# Patient Record
Sex: MALE | Race: BLACK OR AFRICAN AMERICAN | Employment: OTHER | URBAN - METROPOLITAN AREA
[De-identification: names, ages, dates, MRNs, and addresses within clinical notes are randomized per-mention and may not be internally consistent; named-entity substitution may affect disease eponyms.]

---

## 2023-07-03 ENCOUNTER — TELEPHONE (OUTPATIENT)
Dept: UROLOGY | Facility: CLINIC | Age: 73
End: 2023-07-03
Payer: MEDICARE

## 2023-07-13 DIAGNOSIS — R31.9 HEMATURIA: Primary | ICD-10-CM

## 2023-07-26 ENCOUNTER — OFFICE VISIT (OUTPATIENT)
Dept: UROLOGY | Facility: CLINIC | Age: 73
End: 2023-07-26
Payer: MEDICARE

## 2023-07-26 VITALS — RESPIRATION RATE: 18 BRPM | HEIGHT: 66 IN | BODY MASS INDEX: 32.78 KG/M2 | WEIGHT: 204 LBS

## 2023-07-26 DIAGNOSIS — R31.21 ASYMPTOMATIC MICROSCOPIC HEMATURIA: ICD-10-CM

## 2023-07-26 LAB
BILIRUBIN, UA POC OHS: NEGATIVE
BLOOD, UA POC OHS: ABNORMAL
CLARITY, UA POC OHS: CLEAR
COLOR, UA POC OHS: YELLOW
GLUCOSE, UA POC OHS: NEGATIVE
KETONES, UA POC OHS: NEGATIVE
LEUKOCYTES, UA POC OHS: ABNORMAL
NITRITE, UA POC OHS: NEGATIVE
PH, UA POC OHS: 5.5
PROTEIN, UA POC OHS: >=300
SPECIFIC GRAVITY, UA POC OHS: 1.02
UROBILINOGEN, UA POC OHS: 0.2

## 2023-07-26 PROCEDURE — 1101F PR PT FALLS ASSESS DOC 0-1 FALLS W/OUT INJ PAST YR: ICD-10-PCS | Mod: CPTII,S$GLB,, | Performed by: NURSE PRACTITIONER

## 2023-07-26 PROCEDURE — 3008F BODY MASS INDEX DOCD: CPT | Mod: CPTII,S$GLB,, | Performed by: NURSE PRACTITIONER

## 2023-07-26 PROCEDURE — 1159F MED LIST DOCD IN RCRD: CPT | Mod: CPTII,S$GLB,, | Performed by: NURSE PRACTITIONER

## 2023-07-26 PROCEDURE — 1126F PR PAIN SEVERITY QUANTIFIED, NO PAIN PRESENT: ICD-10-PCS | Mod: CPTII,S$GLB,, | Performed by: NURSE PRACTITIONER

## 2023-07-26 PROCEDURE — 99204 OFFICE O/P NEW MOD 45 MIN: CPT | Mod: S$GLB,,, | Performed by: NURSE PRACTITIONER

## 2023-07-26 PROCEDURE — 1159F PR MEDICATION LIST DOCUMENTED IN MEDICAL RECORD: ICD-10-PCS | Mod: CPTII,S$GLB,, | Performed by: NURSE PRACTITIONER

## 2023-07-26 PROCEDURE — 1101F PT FALLS ASSESS-DOCD LE1/YR: CPT | Mod: CPTII,S$GLB,, | Performed by: NURSE PRACTITIONER

## 2023-07-26 PROCEDURE — 1160F PR REVIEW ALL MEDS BY PRESCRIBER/CLIN PHARMACIST DOCUMENTED: ICD-10-PCS | Mod: CPTII,S$GLB,, | Performed by: NURSE PRACTITIONER

## 2023-07-26 PROCEDURE — 4010F ACE/ARB THERAPY RXD/TAKEN: CPT | Mod: CPTII,S$GLB,, | Performed by: NURSE PRACTITIONER

## 2023-07-26 PROCEDURE — 1126F AMNT PAIN NOTED NONE PRSNT: CPT | Mod: CPTII,S$GLB,, | Performed by: NURSE PRACTITIONER

## 2023-07-26 PROCEDURE — 4010F PR ACE/ARB THEARPY RXD/TAKEN: ICD-10-PCS | Mod: CPTII,S$GLB,, | Performed by: NURSE PRACTITIONER

## 2023-07-26 PROCEDURE — 81003 URINALYSIS AUTO W/O SCOPE: CPT | Mod: QW,S$GLB,, | Performed by: NURSE PRACTITIONER

## 2023-07-26 PROCEDURE — 3288F FALL RISK ASSESSMENT DOCD: CPT | Mod: CPTII,S$GLB,, | Performed by: NURSE PRACTITIONER

## 2023-07-26 PROCEDURE — 99204 PR OFFICE/OUTPT VISIT, NEW, LEVL IV, 45-59 MIN: ICD-10-PCS | Mod: S$GLB,,, | Performed by: NURSE PRACTITIONER

## 2023-07-26 PROCEDURE — 81003 POCT URINALYSIS(INSTRUMENT): ICD-10-PCS | Mod: QW,S$GLB,, | Performed by: NURSE PRACTITIONER

## 2023-07-26 PROCEDURE — 3288F PR FALLS RISK ASSESSMENT DOCUMENTED: ICD-10-PCS | Mod: CPTII,S$GLB,, | Performed by: NURSE PRACTITIONER

## 2023-07-26 PROCEDURE — 3008F PR BODY MASS INDEX (BMI) DOCUMENTED: ICD-10-PCS | Mod: CPTII,S$GLB,, | Performed by: NURSE PRACTITIONER

## 2023-07-26 PROCEDURE — 1160F RVW MEDS BY RX/DR IN RCRD: CPT | Mod: CPTII,S$GLB,, | Performed by: NURSE PRACTITIONER

## 2023-07-26 RX ORDER — ALOGLIPTIN 12.5 MG/1
TABLET, FILM COATED ORAL
COMMUNITY

## 2023-07-26 RX ORDER — GABAPENTIN 600 MG/1
600 TABLET ORAL
COMMUNITY

## 2023-07-26 RX ORDER — AMIODARONE HYDROCHLORIDE 200 MG/1
TABLET ORAL
COMMUNITY
Start: 2023-05-10

## 2023-07-26 RX ORDER — LISINOPRIL 10 MG/1
10 TABLET ORAL
COMMUNITY
Start: 2022-11-09

## 2023-07-26 RX ORDER — METHOCARBAMOL 500 MG/1
TABLET, FILM COATED ORAL
COMMUNITY
Start: 2023-06-12

## 2023-07-26 RX ORDER — PREDNISONE 20 MG/1
40 TABLET ORAL
COMMUNITY
Start: 2023-06-12

## 2023-07-26 RX ORDER — MIRABEGRON 25 MG/1
25 TABLET, FILM COATED, EXTENDED RELEASE ORAL
COMMUNITY
Start: 2023-07-20

## 2023-07-26 RX ORDER — SERTRALINE HYDROCHLORIDE 100 MG/1
100 TABLET, FILM COATED ORAL
COMMUNITY

## 2023-07-26 RX ORDER — PRAVASTATIN SODIUM 20 MG/1
20 TABLET ORAL
COMMUNITY

## 2023-07-26 RX ORDER — CALCIUM CITRATE MALATE/VIT D3 250 MG-2.5
1 TABLET ORAL 2 TIMES DAILY
COMMUNITY

## 2023-07-26 RX ORDER — EZETIMIBE 10 MG/1
10 TABLET ORAL
COMMUNITY

## 2023-07-26 RX ORDER — ALLOPURINOL 100 MG/1
100 TABLET ORAL
COMMUNITY

## 2023-07-26 RX ORDER — DOXAZOSIN 1 MG/1
1 TABLET ORAL NIGHTLY
COMMUNITY
Start: 2023-07-20

## 2023-07-26 RX ORDER — SEMAGLUTIDE 0.68 MG/ML
INJECTION, SOLUTION SUBCUTANEOUS
COMMUNITY
Start: 2023-06-26

## 2023-07-26 NOTE — PROGRESS NOTES
Subjective:       Patient ID: Vickey Barber is a 73 y.o. male.    Chief Complaint: Hematuria      HPI: 73-year-old male, new to [facility], referred for hematuria.    Patient has history of microscopic hematuria.    Patient denies any visible blood in urine.  Denies any unexpected weight loss.  Denies any pain burning urination.  Denies any odor urine.  Denies any fever or body aches.      Patient did have an ultrasound done on 07/10/2023.  Ultrasound shows a 8 mm cyst in the mid zone in the right kidney and a 6 mm cyst in the mid zone of the left kidney.  No masses noted.      Patient is a nonsmoker.    However, he is retired from local [employer].    No other urinary complaints at this time.       Past Medical History:   Past Medical History:   Diagnosis Date    Hypertension        Past Surgical Historical: History reviewed. No pertinent surgical history.     Medications:   Medication List with Changes/Refills   Current Medications    ALLOPURINOL (ZYLOPRIM) 100 MG TABLET    Take 100 mg by mouth.    ALOGLIPTIN (NESINA) 12.5 MG TAB    Take by mouth.    AMIODARONE (PACERONE) 200 MG TAB        CALCIUM CITRATE MALATE-VIT D3 (OSCAL) 250 MG-2.5 MCG (100 UNIT) TAB    Take 1 tablet by mouth 2 (two) times daily.    DOXAZOSIN (CARDURA) 1 MG TABLET    Take 1 mg by mouth every evening.    EZETIMIBE (ZETIA) 10 MG TABLET    Take 10 mg by mouth.    GABAPENTIN (NEURONTIN) 600 MG TABLET    Take 600 mg by mouth.    LISINOPRIL 10 MG TABLET    Take 10 mg by mouth.    METHOCARBAMOL (ROBAXIN) 500 MG TAB    TAKE 2 TABLETS BY MOUTH THREE TIMES DAILY AS NEEDED FOR PAIN AND FOR MUSCLE SPASM    MYRBETRIQ 25 MG TB24 ER TABLET    Take 25 mg by mouth.    OZEMPIC 0.25 MG OR 0.5 MG (2 MG/3 ML) PEN INJECTOR    INJECT 1 /4 MG SUBCUTANEOUSLY ONCE A WEEK INCREASE TO 0.5 MG WEEKLY AFTER 4 WEEKS.STOP ALOGLIPTIN IF TAKING THIS MEDICATION    PRAVASTATIN (PRAVACHOL) 20 MG TABLET    Take 20 mg by mouth.    PREDNISONE (DELTASONE) 20 MG TABLET    Take 40 mg  by mouth.    SERTRALINE (ZOLOFT) 100 MG TABLET    Take 100 mg by mouth.        Past Social History:   Social History     Socioeconomic History    Marital status:    Tobacco Use    Smoking status: Never    Smokeless tobacco: Never       Allergies:   Review of patient's allergies indicates:   Allergen Reactions    Iodine         Family History: History reviewed. No pertinent family history.     Review of Systems:  Review of Systems   Constitutional:  Negative for activity change and appetite change.   HENT:  Negative for congestion and dental problem.    Eyes:  Negative for visual disturbance.   Respiratory:  Negative for chest tightness and shortness of breath.    Cardiovascular:  Negative for chest pain.   Gastrointestinal:  Negative for abdominal distention and abdominal pain.   Genitourinary:  Positive for hematuria. Negative for decreased urine volume, difficulty urinating, dysuria, enuresis, flank pain, frequency, genital sores, penile discharge, penile pain, penile swelling, scrotal swelling, testicular pain and urgency.   Musculoskeletal:  Negative for back pain and neck pain.   Skin:  Negative for color change.   Neurological:  Negative for dizziness.   Hematological:  Negative for adenopathy.   Psychiatric/Behavioral:  Negative for agitation, behavioral problems and confusion.      Physical Exam:  Physical Exam  Vitals and nursing note reviewed.   Constitutional:       Appearance: He is well-developed.   HENT:      Head: Normocephalic.   Eyes:      Pupils: Pupils are equal, round, and reactive to light.   Cardiovascular:      Rate and Rhythm: Normal rate and regular rhythm.      Heart sounds: Normal heart sounds.   Pulmonary:      Effort: Pulmonary effort is normal.      Breath sounds: Normal breath sounds.   Abdominal:      General: Bowel sounds are normal.      Palpations: Abdomen is soft.   Musculoskeletal:         General: Normal range of motion.      Cervical back: Normal range of motion and neck  supple.   Skin:     General: Skin is warm and dry.   Neurological:      Mental Status: He is alert and oriented to person, place, and time.   Psychiatric:         Behavior: Behavior normal.     Urinalysis:  Large blood, red blood cells 50-75.  Trace leukocytes white blood cell 6 to 10, epithelial +1, bacteria +1.    Assessment/Plan:   Microscopic hematuria: Patient has had ultrasound with no renal masses noted.    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    Will arrange cysto with Dr. Lujan for further evaluation.    Follow-up to be arranged.  Problem List Items Addressed This Visit    None  Visit Diagnoses       Asymptomatic microscopic hematuria        Relevant Orders    POCT Urinalysis(Instrument) (Completed)    Cystoscopy

## 2023-08-21 ENCOUNTER — TELEPHONE (OUTPATIENT)
Dept: UROLOGY | Facility: CLINIC | Age: 73
End: 2023-08-21
Payer: MEDICARE

## 2023-10-04 ENCOUNTER — TELEPHONE (OUTPATIENT)
Dept: UROLOGY | Facility: CLINIC | Age: 73
End: 2023-10-04
Payer: MEDICARE

## 2023-10-05 ENCOUNTER — PROCEDURE VISIT (OUTPATIENT)
Dept: UROLOGY | Facility: CLINIC | Age: 73
End: 2023-10-05
Payer: MEDICARE

## 2023-10-05 VITALS
HEIGHT: 66 IN | DIASTOLIC BLOOD PRESSURE: 86 MMHG | OXYGEN SATURATION: 95 % | SYSTOLIC BLOOD PRESSURE: 144 MMHG | RESPIRATION RATE: 20 BRPM | BODY MASS INDEX: 34.26 KG/M2 | HEART RATE: 66 BPM | WEIGHT: 213.19 LBS

## 2023-10-05 DIAGNOSIS — R31.21 ASYMPTOMATIC MICROSCOPIC HEMATURIA: ICD-10-CM

## 2023-10-05 PROCEDURE — 52000 CYSTOSCOPY: ICD-10-PCS | Mod: S$GLB,,, | Performed by: UROLOGY

## 2023-10-05 PROCEDURE — 52000 CYSTOURETHROSCOPY: CPT | Mod: S$GLB,,, | Performed by: UROLOGY

## 2023-10-05 NOTE — PATIENT INSTRUCTIONS
Patient Education       Cystoscopy Discharge Instructions   About this topic   Your kidneys make urine. It is stored in your bladder. The urethra is a tube at the bottom of the bladder. Urine flows out of this tube. Sometimes, there is a blockage and urine is not able to leave the body.  A cystoscopy is a procedure that lets the doctor see the inside of your bladder and urethra. The doctor does it to:  Look for stones or tumors blocking the bladder and urethra  Look for changes or injury inside the bladder  Take a tissue sample from the inside of your bladder  Look for reasons for blood in the urine, pain with urination, or why you are passing urine often  Look for prostate problems     What care is needed at home?   Ask your doctor what you need to do when you go home. Make sure you ask questions if you do not understand what the doctor says. This way you will know what you need to do.  Take a warm bath or use a warm wet washcloth over the opening to the urethra. This will help to ease any pain. Do this as needed.  Drink 6 to 8 glasses of water a day and 3 to 4 glasses in the first few hours after the procedure to flush out your bladder and reduce irritation.  You may see some blood in your urine for a few days. This is normal.  Empty your bladder as soon as you feel the need to. Don't delay going to the bathroom. It stretches and weakens the bladder.  What follow-up care is needed?   Your doctor may ask you to make visits to the office to check on your progress. Be sure to keep these visits.  If you had a biopsy, talk with your doctor about the results.  What drugs may be needed?   The doctor may order drugs to:  Help with pain  Fight an infection  Help with bladder spasms  Will physical activity be limited?   Talk to your doctor about when you may go back to your normal activities like work, driving, or sex.  What problems could happen?   Bleeding  Infection  Injury to the bladder and urethra  Discomfort in the  urethra area  Burning sensation for a short time  Upset stomach  When do I need to call the doctor?   Signs of infection. These include a fever of 100.4°F (38°C) or higher, chills, pain with passing urine.  Pain that does not go away even with drugs or that lasts longer than 2 days  Too much blood in your urine  Passing large dime-sized clots  Cloudy urine  Little or no urine or not able to pass urine  Abdominal pain and nausea  Teach Back: Helping You Understand   The Teach Back Method helps you understand the information we are giving you. After you talk with the staff, tell them in your own words what you learned. This helps to make sure the staff has described each thing clearly. It also helps to explain things that may have been confusing. Before going home, make sure you can do these:  I can tell you about my procedure.  I can tell you what may help ease my pain.  I can tell you what I will do if I have a fever, chills, or am not able to pass urine.  Where can I learn more?   American Cancer Society  https://www.cancer.org/treatment/understanding-your-diagnosis/tests/endoscopy/cystoscopy.html   Cancer Research [place]  https://www.cancerresearchuk.org/about-cancer/bladder-cancer/getting-diagnosed/tests-diagnose/cystoscopy   NHS Choices  http://www.nhs.uk/conditions/Cystoscopy/Pages/Introduction.aspx   Last Reviewed Date   2021-04-22  Consumer Information Use and Disclaimer   This information is not specific medical advice and does not replace information you receive from your health care provider. This is only a brief summary of general information. It does NOT include all information about conditions, illnesses, injuries, tests, procedures, treatments, therapies, discharge instructions or life-style choices that may apply to you. You must talk with your health care provider for complete information about your health and treatment options. This information should not be used to decide whether or not to accept your  health care providers advice, instructions or recommendations. Only your health care provider has the knowledge and training to provide advice that is right for you.  Copyright   Copyright © 2021 [employer] and its affiliates and/or licensors. All rights reserved.

## 2023-10-05 NOTE — PROCEDURES
Cystoscopy    Date/Time: 10/5/2023 11:00 AM    Performed by: Sridhar Lujan MD  Authorized by: Joshua Yang NP    Consent Done?:  Yes (Written)  Timeout: prior to procedure the correct patient, procedure, and site was verified    Prep: patient was prepped and draped in usual sterile fashion    Anesthesia:  Intraurethral instillation  Indications: hematuria    Position:  Supine  Anesthesia:  Intraurethral instillation  Patient sedated?: No    Preparation: Patient was prepped and draped in usual sterile fashion    Scope type:  Flexible cystoscope  External exam normal: Yes    Urethra normal: Yes    Prostate normal: Yes    Comments:      Patient was brought to the procedure room placed on the table padded prepped and draped in usual sterile fashion in supine position. The cystoscope was inserted into the urethra and advanced the urethra was normal prostate showed expected enlargement for patient's age, the bladder is entered and inspected is found to be free of tumor stone or foreign body.  Bilateral ureteral orifices were identified and noted to be normal in appearance with clear efflux of urine at this point the scope was removed the patient tolerated the procedure well there were no complications

## 2024-04-09 ENCOUNTER — TELEPHONE (OUTPATIENT)
Dept: TRANSPLANT | Facility: CLINIC | Age: 74
End: 2024-04-09
Payer: MEDICARE

## 2024-04-09 ENCOUNTER — DOCUMENTATION ONLY (OUTPATIENT)
Dept: TRANSPLANT | Facility: CLINIC | Age: 74
End: 2024-04-09
Payer: MEDICARE

## 2024-10-18 DIAGNOSIS — R31.9 HEMATURIA: Primary | ICD-10-CM

## 2024-10-24 ENCOUNTER — OFFICE VISIT (OUTPATIENT)
Dept: UROLOGY | Facility: CLINIC | Age: 74
End: 2024-10-24
Payer: OTHER GOVERNMENT

## 2024-10-24 VITALS
DIASTOLIC BLOOD PRESSURE: 80 MMHG | WEIGHT: 210 LBS | BODY MASS INDEX: 33.75 KG/M2 | SYSTOLIC BLOOD PRESSURE: 122 MMHG | HEIGHT: 66 IN

## 2024-10-24 DIAGNOSIS — R31.21 ASYMPTOMATIC MICROSCOPIC HEMATURIA: Primary | ICD-10-CM

## 2024-10-24 LAB
BILIRUBIN, UA POC OHS: NEGATIVE
BLOOD, UA POC OHS: ABNORMAL
CLARITY, UA POC OHS: CLEAR
COLOR, UA POC OHS: YELLOW
GLUCOSE, UA POC OHS: NEGATIVE
KETONES, UA POC OHS: NEGATIVE
LEUKOCYTES, UA POC OHS: NEGATIVE
NITRITE, UA POC OHS: NEGATIVE
PH, UA POC OHS: 7
PROTEIN, UA POC OHS: NEGATIVE
SPECIFIC GRAVITY, UA POC OHS: 1.02
UROBILINOGEN, UA POC OHS: 0.2

## 2024-10-24 PROCEDURE — 99214 OFFICE O/P EST MOD 30 MIN: CPT | Mod: S$GLB,,, | Performed by: NURSE PRACTITIONER

## 2024-10-24 PROCEDURE — 81003 URINALYSIS AUTO W/O SCOPE: CPT | Mod: QW,S$GLB,, | Performed by: NURSE PRACTITIONER

## 2024-10-24 RX ORDER — TAMSULOSIN HYDROCHLORIDE 0.4 MG/1
1 CAPSULE ORAL EVERY MORNING
COMMUNITY
Start: 2024-09-12

## 2024-10-24 RX ORDER — PANTOPRAZOLE SODIUM 40 MG/1
1 TABLET, DELAYED RELEASE ORAL EVERY MORNING
COMMUNITY

## 2024-10-24 RX ORDER — GLIMEPIRIDE 1 MG/1
1 TABLET ORAL DAILY
COMMUNITY
Start: 2024-10-07 | End: 2024-11-06

## 2024-10-24 RX ORDER — SODIUM BICARBONATE 650 MG/1
650 TABLET ORAL
COMMUNITY
Start: 2024-10-07

## 2024-10-24 RX ORDER — CYCLOBENZAPRINE HCL 5 MG
5 TABLET ORAL 3 TIMES DAILY PRN
COMMUNITY
Start: 2024-09-18

## 2024-10-24 RX ORDER — FUROSEMIDE 40 MG/1
1 TABLET ORAL EVERY MORNING
COMMUNITY
Start: 2024-08-08

## 2024-10-24 NOTE — PROGRESS NOTES
Subjective:       Patient ID: Vickey Barber is a 74 y.o. male.    Chief Complaint: No chief complaint on file.      HPI: 74-year-old male, established patient, last seen July 2023.    [facility].    Patient has a history of microscopic hematuria.  He had a negative workup in 2023.    Patient denies seeing any blood in urine.  Denies any odor urine denies any fever or body aches.  Denies any pain or burning urination.  Denies any unexpected weight loss.  Denies any onset bone pain.    No other urinary complaints at this time.         Past Medical History:   Past Medical History:   Diagnosis Date    Acid reflux     Gout, unspecified     High cholesterol     Hypertension        Past Surgical Historical: History reviewed. No pertinent surgical history.     Medications:   Medication List with Changes/Refills   Current Medications    ALLOPURINOL (ZYLOPRIM) 100 MG TABLET    Take 100 mg by mouth.    ALOGLIPTIN (NESINA) 12.5 MG TAB    Take by mouth.    AMIODARONE (PACERONE) 200 MG TAB        APIXABAN (ELIQUIS) 5 MG TAB    Take 2.5 mg by mouth.    CALCIUM CITRATE MALATE-VIT D3 (OSCAL) 250 MG-2.5 MCG (100 UNIT) TAB    Take 1 tablet by mouth 2 (two) times daily.    CYCLOBENZAPRINE (FLEXERIL) 5 MG TABLET    Take 5 mg by mouth 3 (three) times daily as needed.    DOXAZOSIN (CARDURA) 1 MG TABLET    Take 1 mg by mouth every evening.    EZETIMIBE (ZETIA) 10 MG TABLET    Take 10 mg by mouth.    FUROSEMIDE (LASIX) 40 MG TABLET    Take 1 tablet by mouth every morning.    GABAPENTIN (NEURONTIN) 600 MG TABLET    Take 600 mg by mouth.    GLIMEPIRIDE (AMARYL) 1 MG TABLET    Take 1 tablet by mouth once daily.    LISINOPRIL 10 MG TABLET    Take 10 mg by mouth.    METHOCARBAMOL (ROBAXIN) 500 MG TAB    TAKE 2 TABLETS BY MOUTH THREE TIMES DAILY AS NEEDED FOR PAIN AND FOR MUSCLE SPASM    MYRBETRIQ 25 MG TB24 ER TABLET    Take 25 mg by mouth.    OZEMPIC 0.25 MG OR 0.5 MG (2 MG/3 ML) PEN INJECTOR     INJECT 1 /4 MG SUBCUTANEOUSLY ONCE A WEEK INCREASE TO 0.5 MG WEEKLY AFTER 4 WEEKS.STOP ALOGLIPTIN IF TAKING THIS MEDICATION    PANTOPRAZOLE (PROTONIX) 40 MG TABLET    Take 1 tablet by mouth every morning.    PRAVASTATIN (PRAVACHOL) 20 MG TABLET    Take 20 mg by mouth.    PREDNISONE (DELTASONE) 20 MG TABLET    Take 40 mg by mouth.    SERTRALINE (ZOLOFT) 100 MG TABLET    Take 100 mg by mouth.    SODIUM BICARBONATE 650 MG TABLET    Take 650 mg by mouth.    TAMSULOSIN (FLOMAX) 0.4 MG CAP    Take 1 capsule by mouth every morning.        Past Social History:   Social History     Socioeconomic History    Marital status:    Tobacco Use    Smoking status: Never    Smokeless tobacco: Never       Allergies:   Review of patient's allergies indicates:   Allergen Reactions    Iodine         Family History: No family history on file.     Review of Systems:  Review of Systems   Constitutional:  Negative for activity change and appetite change.   HENT:  Negative for congestion and dental problem.    Eyes:  Negative for visual disturbance.   Respiratory:  Negative for chest tightness and shortness of breath.    Cardiovascular:  Negative for chest pain.   Gastrointestinal:  Negative for abdominal distention and abdominal pain.   Genitourinary:  Positive for hematuria. Negative for decreased urine volume, difficulty urinating, dysuria, enuresis, flank pain, frequency, genital sores, penile discharge, penile pain, penile swelling, scrotal swelling, testicular pain and urgency.   Musculoskeletal:  Negative for back pain and neck pain.   Skin:  Negative for color change.   Neurological:  Negative for dizziness.   Hematological:  Negative for adenopathy.   Psychiatric/Behavioral:  Negative for agitation, behavioral problems and confusion.        Physical Exam:  Physical Exam  Vitals and nursing note reviewed.   Constitutional:       Appearance: He is well-developed.   HENT:      Head: Normocephalic.   Eyes:      Pupils: Pupils are  equal, round, and reactive to light.   Cardiovascular:      Rate and Rhythm: Normal rate and regular rhythm.      Heart sounds: Normal heart sounds.   Pulmonary:      Effort: Pulmonary effort is normal.      Breath sounds: Normal breath sounds.   Abdominal:      General: Bowel sounds are normal.      Palpations: Abdomen is soft.   Musculoskeletal:         General: Normal range of motion.      Cervical back: Normal range of motion and neck supple.   Skin:     General: Skin is warm and dry.   Neurological:      Mental Status: He is alert and oriented to person, place, and time.   Psychiatric:         Behavior: Behavior normal.       Urinalysis: Moderate blood, red blood cells 20-30.    Assessment/Plan:   Microscopic hematuria:  Will repeat workup.  This will be the patient's 2nd workup.    Will schedule patient for CT without contrast due to iodine allergy.    Will schedule patient for cysto with Dr. Lujan.      Follow-up to be arranged.  Problem List Items Addressed This Visit    None  Visit Diagnoses       Asymptomatic microscopic hematuria        Relevant Orders    Cystoscopy    CT Abdomen Pelvis  Without Contrast

## 2025-01-09 ENCOUNTER — PROCEDURE VISIT (OUTPATIENT)
Dept: UROLOGY | Facility: CLINIC | Age: 75
End: 2025-01-09
Payer: OTHER GOVERNMENT

## 2025-01-09 VITALS
HEART RATE: 75 BPM | SYSTOLIC BLOOD PRESSURE: 145 MMHG | WEIGHT: 218 LBS | OXYGEN SATURATION: 97 % | BODY MASS INDEX: 35.19 KG/M2 | RESPIRATION RATE: 14 BRPM | DIASTOLIC BLOOD PRESSURE: 75 MMHG

## 2025-01-09 DIAGNOSIS — N13.8 BPH WITH OBSTRUCTION/LOWER URINARY TRACT SYMPTOMS: Primary | ICD-10-CM

## 2025-01-09 DIAGNOSIS — N40.1 BPH WITH OBSTRUCTION/LOWER URINARY TRACT SYMPTOMS: Primary | ICD-10-CM

## 2025-01-09 DIAGNOSIS — R31.21 ASYMPTOMATIC MICROSCOPIC HEMATURIA: ICD-10-CM

## 2025-01-09 RX ORDER — TAMSULOSIN HYDROCHLORIDE 0.4 MG/1
0.4 CAPSULE ORAL DAILY
Qty: 30 CAPSULE | Refills: 11 | Status: SHIPPED | OUTPATIENT
Start: 2025-01-09 | End: 2026-01-09

## 2025-01-09 RX ORDER — MIRTAZAPINE 15 MG/1
15 TABLET, FILM COATED ORAL NIGHTLY
COMMUNITY
Start: 2024-08-16

## 2025-01-09 RX ORDER — TAMSULOSIN HYDROCHLORIDE 0.4 MG/1
0.4 CAPSULE ORAL DAILY
Qty: 30 CAPSULE | Refills: 11 | Status: CANCELLED | OUTPATIENT
Start: 2025-01-09 | End: 2026-01-09

## 2025-01-09 RX ORDER — BUTALB/ACETAMINOPHEN/CAFFEINE 50-325-40
1 TABLET ORAL 2 TIMES DAILY
COMMUNITY
Start: 2024-08-16

## 2025-01-09 RX ORDER — ALLOPURINOL 300 MG/1
300 TABLET ORAL DAILY
COMMUNITY
Start: 2024-08-16

## 2025-01-09 RX ORDER — PRAVASTATIN SODIUM 40 MG/1
40 TABLET ORAL DAILY
COMMUNITY

## 2025-01-09 RX ORDER — GABAPENTIN 300 MG/1
300 CAPSULE ORAL
COMMUNITY
Start: 2024-09-30

## 2025-01-09 RX ORDER — METOPROLOL SUCCINATE 25 MG/1
25 TABLET, EXTENDED RELEASE ORAL NIGHTLY
COMMUNITY
Start: 2024-08-16

## 2025-01-09 NOTE — PROCEDURES
Cystoscopy    Date/Time: 1/9/2025 3:50 PM    Performed by: Sridhar Lujan MD  Authorized by: Joshua Yang NP    Timeout: prior to procedure the correct patient, procedure, and site was verified    Prep: patient was prepped and draped in usual sterile fashion    Anesthesia:  Intraurethral instillation  Indications: hematuria    Position:  Supine  Anesthesia:  Intraurethral instillation  Patient sedated?: No    Preparation: Patient was prepped and draped in usual sterile fashion    Scope type:  Flexible cystoscope  External exam normal: Yes    Urethra normal: Yes    Prostate normal: Yes    Comments:      Patient was brought to the procedure room placed on the table padded prepped and draped in usual sterile fashion in supine position. The cystoscope was inserted into the urethra and advanced the urethra was normal prostate showed expected enlargement for patient's age, the bladder is entered and inspected is found to be free of tumor stone or foreign body.  Bilateral ureteral orifices were identified and noted to be normal in appearance with clear efflux of urine at this point the scope was removed the patient tolerated the procedure well there were no complications

## 2025-01-09 NOTE — PATIENT INSTRUCTIONS
Patient Education       Cystoscopy Discharge Instructions   About this topic   Your kidneys make urine. It is stored in your bladder. The urethra is a tube at the bottom of the bladder. Urine flows out of this tube. Sometimes, there is a blockage and urine is not able to leave the body.  A cystoscopy is a procedure that lets the doctor see the inside of your bladder and urethra. The doctor does it to:  Look for stones or tumors blocking the bladder and urethra  Look for changes or injury inside the bladder  Take a tissue sample from the inside of your bladder  Look for reasons for blood in the urine, pain with urination, or why you are passing urine often  Look for prostate problems     What care is needed at home?   Ask your doctor what you need to do when you go home. Make sure you ask questions if you do not understand what the doctor says. This way you will know what you need to do.  Take a warm bath or use a warm wet washcloth over the opening to the urethra. This will help to ease any pain. Do this as needed.  Drink 6 to 8 glasses of water a day and 3 to 4 glasses in the first few hours after the procedure to flush out your bladder and reduce irritation.  You may see some blood in your urine for a few days. This is normal.  Empty your bladder as soon as you feel the need to. Don't delay going to the bathroom. It stretches and weakens the bladder.  What follow-up care is needed?   Your doctor may ask you to make visits to the office to check on your progress. Be sure to keep these visits.  If you had a biopsy, talk with your doctor about the results.  What drugs may be needed?   The doctor may order drugs to:  Help with pain  Fight an infection  Help with bladder spasms  Will physical activity be limited?   Talk to your doctor about when you may go back to your normal activities like work, driving, or sex.  What problems could happen?   Bleeding  Infection  Injury to the bladder and urethra  Discomfort in the  urethra area  Burning sensation for a short time  Upset stomach  When do I need to call the doctor?   Signs of infection. These include a fever of 100.4°F (38°C) or higher, chills, pain with passing urine.  Pain that does not go away even with drugs or that lasts longer than 2 days  Too much blood in your urine  Passing large dime-sized clots  Cloudy urine  Little or no urine or not able to pass urine  Abdominal pain and nausea  Teach Back: Helping You Understand   The Teach Back Method helps you understand the information we are giving you. After you talk with the staff, tell them in your own words what you learned. This helps to make sure the staff has described each thing clearly. It also helps to explain things that may have been confusing. Before going home, make sure you can do these:  I can tell you about my procedure.  I can tell you what may help ease my pain.  I can tell you what I will do if I have a fever, chills, or am not able to pass urine.  Where can I learn more?   American Cancer Society  https://www.cancer.org/treatment/understanding-your-diagnosis/tests/endoscopy/cystoscopy.html   Cancer Research UK  https://www.cancerresearchuk.org/about-cancer/bladder-cancer/getting-diagnosed/tests-diagnose/cystoscopy   NHS Choices  http://www.nhs.uk/conditions/Cystoscopy/Pages/Introduction.aspx   Last Reviewed Date   2021-04-22  Consumer Information Use and Disclaimer   This information is not specific medical advice and does not replace information you receive from your health care provider. This is only a brief summary of general information. It does NOT include all information about conditions, illnesses, injuries, tests, procedures, treatments, therapies, discharge instructions or life-style choices that may apply to you. You must talk with your health care provider for complete information about your health and treatment options. This information should not be used to decide whether or not to accept your  health care providers advice, instructions or recommendations. Only your health care provider has the knowledge and training to provide advice that is right for you.  Copyright   Copyright © 2021 [employer] and its affiliates and/or licensors. All rights reserved.

## 2025-01-16 ENCOUNTER — TELEPHONE (OUTPATIENT)
Dept: UROLOGY | Facility: CLINIC | Age: 75
End: 2025-01-16
Payer: OTHER GOVERNMENT

## 2025-01-16 NOTE — TELEPHONE ENCOUNTER
Pt calling regarding why he needs to call centralized scheduling. Pt informed to call to schedule his CT. Pt verbalized understanding.    ----- Message from Cyndi sent at 1/16/2025 10:42 AM CST -----  Contact: URBAN DIALLO [[id number]]  ..Type:  Patient Requesting Call    Who Called:URBAN DIALLO [[id number]]   What is the call regarding?: was told to call central scheduling but doesn't remember why.  Would the patient rather a call back or a response via [facility]?  call  Best Call Back Number: [phone] (home)   Additional Information: